# Patient Record
Sex: FEMALE | Employment: FULL TIME | URBAN - METROPOLITAN AREA
[De-identification: names, ages, dates, MRNs, and addresses within clinical notes are randomized per-mention and may not be internally consistent; named-entity substitution may affect disease eponyms.]

---

## 2024-05-23 ENCOUNTER — TELEPHONE (OUTPATIENT)
Dept: NEUROSURGERY | Facility: CLINIC | Age: 54
End: 2024-05-23
Payer: COMMERCIAL

## 2024-05-23 NOTE — TELEPHONE ENCOUNTER
Received [facility] spine referral. Awaiting medical records and imaging.

## 2024-05-29 ENCOUNTER — TELEPHONE (OUTPATIENT)
Dept: NEUROSURGERY | Facility: CLINIC | Age: 54
End: 2024-05-29
Payer: COMMERCIAL

## 2024-05-31 ENCOUNTER — TELEPHONE (OUTPATIENT)
Dept: NEUROSURGERY | Facility: CLINIC | Age: 54
End: 2024-05-31
Payer: COMMERCIAL

## 2024-05-31 NOTE — TELEPHONE ENCOUNTER
Spoke to [facility] patient, telephone spine screen scheduled for 6/4 at 10am. PCP is Flaquita Ahumada, will resend PCP letter.

## 2024-06-03 ENCOUNTER — TELEPHONE (OUTPATIENT)
Dept: NEUROSURGERY | Facility: CLINIC | Age: 54
End: 2024-06-03
Payer: COMMERCIAL

## 2024-06-04 ENCOUNTER — TELEPHONE (OUTPATIENT)
Dept: NEUROSURGERY | Facility: CLINIC | Age: 54
End: 2024-06-04
Payer: COMMERCIAL

## 2024-06-04 RX ORDER — ALBUTEROL SULFATE 0.83 MG/ML
2.5 SOLUTION RESPIRATORY (INHALATION) DAILY PRN
COMMUNITY
Start: 2024-06-03

## 2024-06-04 RX ORDER — BUPROPION HYDROCHLORIDE 300 MG/1
300 TABLET ORAL DAILY
COMMUNITY
Start: 2024-01-24

## 2024-06-04 RX ORDER — ALBUTEROL SULFATE 90 UG/1
AEROSOL, METERED RESPIRATORY (INHALATION) EVERY 4 HOURS PRN
COMMUNITY
Start: 2024-04-18

## 2024-06-04 RX ORDER — CYCLOBENZAPRINE HCL 5 MG
5 TABLET ORAL 3 TIMES DAILY
COMMUNITY
Start: 2024-05-28

## 2024-06-04 RX ORDER — LISDEXAMFETAMINE DIMESYLATE 70 MG/1
70 CAPSULE ORAL EVERY MORNING
COMMUNITY

## 2024-06-04 RX ORDER — CELECOXIB 200 MG/1
200 CAPSULE ORAL DAILY
COMMUNITY
Start: 2024-05-28

## 2024-06-04 NOTE — TELEPHONE ENCOUNTER
"Spoke to patient on: 6/4/24    Patient works at [employer]  Are you currently working? : Yes  - yes, heavy lifting- chiropractor took patient off work for medical leave  currently on intermittently FMLA- started on 6/4 for continuous leave  Are you on workers comp?No   Are you involved in any ligation at this time? No   Do you have HSA insurance? No                Have support to help with care and appointments: Yes   Travel Caregiver:         What is your chief complaint? have back pain - lower back- affects my hips and legs hurting  pain in both hips- pain mostly in left leg and now right leg- pain all the way down- feels heavier in thigh ("whole leg")- from inside knee to thigh gets numb sometimes- "feels tight" like a knot around it    How long has it been going on? few months- had been going to a chiropractor- he would "pull my leg" felt worse    Have you had pain like this before?No     Have you sought treatment for this condition in the past?Yes   If yes, what treatments did you then?  If yes, when did those stop working?    Where is the pain the worst?  lower back    Does the pain radiate? down both legs    Where else do you hurt? pain in upper back but not very often    Is the pain constant or does it go away to a 0/10 at times even if the pain comes right back? intermittent - when working 8-10 at rest about a 6    What makes the pain worse? lifting, squatting, bending- all aggravates    What makes the pain better or decreases the pain (which position is least uncomfortable)? ice    Any other symptoms? numbness in front of thighs Left worse than right  occasional weakness- have to sit down  No falls    Any bowel or bladder incontinence? no  Or changes? no    What treatments have you tried for your current pain, and did they help?   Physical therapy? no   Chiropractor? since February once/twice a month- then was referred to pain management   Injections? What kinds - yes- lumbar (done on May 27th- " "from Dr. Thompson Tijerina- spine and orthopedic)   as of 1 week-   Prior back surgery? no   Medication?      BMI:   Ht: 5'5"  Weight: 209    AIC: 6.1 (January 2024)    Social Hx:    Tobacco Use: Low Risk  (6/4/2024)    Patient History     Smoking Tobacco Use: Never     Smokeless Tobacco Use: Never     Passive Exposure: Not on file                    Allergies:   Review of patient's allergies indicates:  No Known Allergies      Medication list:   Current Outpatient Medications on File Prior to Visit   Medication Sig Dispense Refill    albuterol (PROVENTIL) 2.5 mg /3 mL (0.083 %) nebulizer solution Take 2.5 mg by nebulization daily as needed.      albuterol (PROVENTIL/VENTOLIN HFA) 90 mcg/actuation inhaler Inhale into the lungs every 4 (four) hours as needed for Wheezing.      buPROPion (WELLBUTRIN XL) 300 MG 24 hr tablet Take 300 mg by mouth once daily.      celecoxib (CELEBREX) 200 MG capsule Take 200 mg by mouth once daily.      cyclobenzaprine (FLEXERIL) 5 MG tablet Take 5 mg by mouth 3 (three) times daily.      lisdexamfetamine (VYVANSE) 70 MG capsule Take 70 mg by mouth every morning.       No current facility-administered medications on file prior to visit.         Health Hx:  Past Medical History:   Diagnosis Date    ADHD     Anxiety disorder, unspecified     Depression     Headache     Mild intermittent asthma, uncomplicated          Surgical Hx:   Past Surgical History:   Procedure Laterality Date    TUBAL LIGATION           Can you take one flight of stairs: no    Ever been diagnosed with sleep apnea No    CPAPNo    Images Received:  MRI Yes    Type:  Xray Yes   Type:  EMG  No       Surgery recommended: Yes disk replacement    PCP Information:   No primary care provider on file.      Spoke with pt, reviewed history, discussed timeframe and expectation regarding the [facility]ine program, discussed logging in to the [facility] portal and to expect link to complete online seminar, confirmed PCP. Explained I would " be sending imaging, chart review and spine screen assessment to spine surgeon, then to Dr. Campbell to see if any additional orders are needed other than CXR and non bundled labs and I would reach back out when this information if obtained.  Explained that initial trip is an evaluation visit and if surgery needed and determined a surgery date would be determined with surgeon and patient. From there, I would send preop orders to PCP for them to complete at home.       Discussed surgery requiremernts of BMi less than 40, AIC less than 8 and smoking cessation 6 weeks prior to surgery with a required cotine test 2 weeks prior to surgery. Patient verbalized understanding.      Patient verbalized understanding of the above and instructed to reach out with any questions or concerns. Direct phone # given and explained the use of patient portal communication.      Patient wants me to send her chiropractor/medicine doctor the PCP agreement.  Will fax to Dr. Salazar  [phone]/fax [phone]    Once PCP agreement received, can schedule initial evaluation.    Anh John, RN, CMSRN  RN Navigator  [facility]  Ph [phone]  Fax [phone]

## 2024-06-11 ENCOUNTER — TELEPHONE (OUTPATIENT)
Dept: NEUROSURGERY | Facility: CLINIC | Age: 54
End: 2024-06-11
Payer: COMMERCIAL

## 2024-06-11 DIAGNOSIS — M54.9 DORSALGIA: Primary | ICD-10-CM

## 2024-06-11 NOTE — TELEPHONE ENCOUNTER
Received PCP agreement. Called patient and discussed initial evaluation dates- patient will fly in 6/23-6/26. Will submit Plan of care to [id number] and work on appointments. Patient agreeable to dates.      Anh John RN, CMSRN  RN Navigator  [facility]   [phone]  Fax [phone]

## 2024-06-21 NOTE — PROGRESS NOTES
Subjective:     Patient ID: Yudelka Caceres is a 53 y.o. female.    Chief Complaint: Low-back Pain    Ms Caceres is a 54 yo female [facility] patient here for evaluation of low back pain.  She has had back pain for the past year but worse the past 4 months.  The pain started with spasms in hips.  The pain is the thigh.  The pain is sometimes inner thigh and sometimes outer and sometimes front.  There is some feet burning.  The pain is worse with standing and walking.  She is off work and so pain is better.  It hurt more with walking squatting and lifting at work.  The pain is throbbing and tingling.  The pain can be shocking.  The pain is not with transition.  The pain is 2/10 now, worst 6/10 walking in airport, best 2/10 in the morning before much activity.  She has not been to PT.  She has been going to chiropractor with no relief.  She had injections with some relief but complete relief.  She had injection 1 month ago with 30% relief.  She is taking celebrex and flexeril    X-ray lumbar 6/24/2024  Lumbar spine five views:     No pars defects are seen.  Alignment is normal.  There is mild DJD.  No acute trauma seen.     No instability seen.     Impression:     No acute process seen.      MRI lumbar 4/9/2024  There is normal alignment.  L1-2: There is no disc herniation.  L2-3: There is no disc herniation.  L3-4: There is a slight disc bulge and there is mild to moderate 1 degenerative facet change.  L4-5: There is a mild disc bulge and moderate degenerative facet change.  L541: There is disc space narrowing and degenerative endplate change. There is a mild disc bulge combining with  degenerative facet change to cause bilateral foraminal stenosis. There is normal signal in the thecal sac and nerve roots.  There is an incidentally noted left renal cyst.  IMPRESSION  1. Degenerative disc and facet change at multiple levels as described above.  2. At L5-S1 there is bilateral foraminal stenosis.    Past Medical History:  No  date: ADHD  No date: Anxiety disorder, unspecified  No date: Depression  No date: Headache  No date: Mild intermittent asthma, uncomplicated    Past Surgical History:  No date: TUBAL LIGATION    No family history on file.      Social History    Socioeconomic History      Marital status: Single    Tobacco Use      Smoking status: Never      Smokeless tobacco: Never      Current Outpatient Medications:  albuterol (PROVENTIL) 2.5 mg /3 mL (0.083 %) nebulizer solution, Take 2.5 mg by nebulization daily as needed., Disp: , Rfl:   albuterol (PROVENTIL/VENTOLIN HFA) 90 mcg/actuation inhaler, Inhale into the lungs every 4 (four) hours as needed for Wheezing., Disp: , Rfl:   buPROPion (WELLBUTRIN XL) 300 MG 24 hr tablet, Take 300 mg by mouth once daily., Disp: , Rfl:   celecoxib (CELEBREX) 200 MG capsule, Take 200 mg by mouth once daily., Disp: , Rfl:   cyclobenzaprine (FLEXERIL) 5 MG tablet, Take 5 mg by mouth 3 (three) times daily., Disp: , Rfl:   lisdexamfetamine (VYVANSE) 70 MG capsule, Take 70 mg by mouth every morning., Disp: , Rfl:     No current facility-administered medications for this visit.      Review of patient's allergies indicates:  No Known Allergies        Review of Systems   Constitutional: Negative for weight gain and weight loss.   Cardiovascular:  Negative for chest pain.   Respiratory:  Positive for shortness of breath.    Musculoskeletal:  Positive for back pain. Negative for joint pain and joint swelling.   Gastrointestinal:  Negative for abdominal pain, bowel incontinence, nausea and vomiting.   Genitourinary:  Negative for bladder incontinence.   Neurological:  Positive for numbness and paresthesias (feet and legs and hands).        Objective:     General: Yudelka is well-developed, well-nourished, appears stated age, in no acute distress, alert and oriented to time, place and person.     General    Vitals reviewed.  Constitutional: She is oriented to person, place, and time. She appears  well-developed and well-nourished.   HENT:   Head: Normocephalic and atraumatic.   Pulmonary/Chest: Effort normal.   Neurological: She is alert and oriented to person, place, and time.   Psychiatric: She has a normal mood and affect. Her behavior is normal. Judgment and thought content normal.     General Musculoskeletal Exam   Gait: normal     Right Ankle/Foot Exam     Tests   Heel Walk: able to perform  Tiptoe Walk: able to perform    Left Ankle/Foot Exam     Tests   Heel Walk: able to perform  Tiptoe Walk: able to perform  Back (L-Spine & T-Spine) / Neck (C-Spine) Exam     Tenderness Right paramedian tenderness of the Sacrum. Left paramedian tenderness of the Sacrum.     Back (L-Spine & T-Spine) Range of Motion   Extension:  20   Flexion:  90   Lateral bend right:  20   Lateral bend left:  20   Rotation right:  40   Rotation left:  40     Spinal Sensation   Right Side Sensation  C-Spine Level: normal   L-Spine Level: normal  S-Spine Level: normal  Left Side Sensation  C-Spine Level: normal  L-Spine Level: normal  S-Spine Level: normal    Back (L-Spine & T-Spine) Tests   Right Side Tests  Straight leg raise:        Sitting SLR: > 70 degrees    Left Side Tests  Straight leg raise:       Sitting SLR: > 70 degrees      Other   She has no scoliosis .  Spinal Kyphosis:  Absent    Comments:  Left hip pain with ER of the left hip  Neg LAVERN bilaterally      Muscle Strength   Right Upper Extremity   Biceps: 5/5   Deltoid:  5/5  Triceps:  5/5  Wrist extension: 5/5   Finger Flexors:  5/5  Left Upper Extremity  Biceps: 5/5   Deltoid:  5/5  Triceps:  5/5  Wrist extension: 5/5   Finger Flexors:  5/5  Right Lower Extremity   Hip Flexion: 5/5   Quadriceps:  5/5   Anterior tibial:  5/5   EHL:  5/5  Left Lower Extremity   Hip Flexion: 5/5   Quadriceps:  5/5   Anterior tibial:  5/5   EHL:  5/5    Reflexes     Left Side  Biceps:  2+  Triceps:  2+  Brachioradialis:  2+  Achilles:  2+  Left Morrison's Sign:  Absent  Babinski Sign:   absent  Quadriceps:  2+    Right Side   Biceps:  2+  Triceps:  2+  Brachioradialis:  2+  Achilles:  2+  Right Morrison's Sign:  absent  Babinski Sign:  absent  Quadriceps:  2+    Vascular Exam     Right Pulses        Carotid:                  2+    Left Pulses        Carotid:                  2+          Assessment:     1. Spondylosis of lumbar region without myelopathy or radiculopathy    2. Bilateral hip pain         Plan:     Orders Placed This Encounter    X-Ray Hips Bilateral 2 View Incl AP Pelvis    Ambulatory referral/consult to [facility]     We discussed back pain and the nature of back pain.  We discussed that it is not one thing that causes the pain but an accumulation of multiple things that we do.  She has had pain for a year but worse the past 4 months.  Imaging shows DJD.  She does have some pain with left hip ROM  Hip x-rays  We discussed posture sitting and the importance of trying to sit better.  We discussed the importance of sitting with good posture and standing breaks.  She is off work, so encourage her to stay active  We discussed the benefits of therapy and exercise and continuing to move.  She says her  reminds her to stay active  Healthy back eval today we discussed piriformis stretches and back and core strengthening flexion exercises  We discussed pt close to home  Treat your own back book  Neurosurgery and pain to evaluate    More than 50% of the total time  of 45 minutes was spent face to face in counseling on diagnosis and treatment options. I also counseled patient  on common and most usual side effect of prescribed medications.  I reviewed Primary care , and other specialty's notes to better coordinate patient's care. All questions were answered, and patient voiced understanding.       Follow-up: No follow-ups on file. If there are any questions prior to this, the patient was instructed to contact the office.

## 2024-06-24 ENCOUNTER — HOSPITAL ENCOUNTER (OUTPATIENT)
Dept: RADIOLOGY | Facility: OTHER | Age: 54
Discharge: HOME | End: 2024-06-24
Attending: NEUROLOGICAL SURGERY
Payer: COMMERCIAL

## 2024-06-24 ENCOUNTER — CLINICAL SUPPORT (OUTPATIENT)
Dept: REHABILITATION | Facility: OTHER | Age: 54
End: 2024-06-24
Payer: COMMERCIAL

## 2024-06-24 ENCOUNTER — OFFICE VISIT (OUTPATIENT)
Dept: SPINE | Facility: CLINIC | Age: 54
End: 2024-06-24
Payer: COMMERCIAL

## 2024-06-24 ENCOUNTER — OFFICE VISIT (OUTPATIENT)
Dept: SPINE | Facility: CLINIC | Age: 54
End: 2024-06-24
Attending: PHYSICAL MEDICINE & REHABILITATION
Payer: COMMERCIAL

## 2024-06-24 ENCOUNTER — HOSPITAL ENCOUNTER (OUTPATIENT)
Dept: RADIOLOGY | Facility: OTHER | Age: 54
Discharge: HOME | End: 2024-06-24
Attending: PHYSICAL MEDICINE & REHABILITATION
Payer: COMMERCIAL

## 2024-06-24 ENCOUNTER — PATIENT MESSAGE (OUTPATIENT)
Dept: SPINE | Facility: CLINIC | Age: 54
End: 2024-06-24

## 2024-06-24 VITALS
SYSTOLIC BLOOD PRESSURE: 114 MMHG | HEIGHT: 65 IN | DIASTOLIC BLOOD PRESSURE: 56 MMHG | WEIGHT: 205.94 LBS | RESPIRATION RATE: 19 BRPM | HEART RATE: 80 BPM | TEMPERATURE: 98 F | BODY MASS INDEX: 34.31 KG/M2

## 2024-06-24 VITALS — HEART RATE: 80 BPM | DIASTOLIC BLOOD PRESSURE: 56 MMHG | TEMPERATURE: 98 F | SYSTOLIC BLOOD PRESSURE: 114 MMHG

## 2024-06-24 VITALS — SYSTOLIC BLOOD PRESSURE: 114 MMHG | HEART RATE: 80 BPM | DIASTOLIC BLOOD PRESSURE: 56 MMHG | TEMPERATURE: 98 F

## 2024-06-24 DIAGNOSIS — M25.551 BILATERAL HIP PAIN: ICD-10-CM

## 2024-06-24 DIAGNOSIS — M47.816 SPONDYLOSIS OF LUMBAR REGION WITHOUT MYELOPATHY OR RADICULOPATHY: Primary | ICD-10-CM

## 2024-06-24 DIAGNOSIS — M54.50 DORSALGIA OF LUMBAR REGION: Primary | ICD-10-CM

## 2024-06-24 DIAGNOSIS — M47.816 SPONDYLOSIS OF LUMBAR REGION WITHOUT MYELOPATHY OR RADICULOPATHY: ICD-10-CM

## 2024-06-24 DIAGNOSIS — M25.552 BILATERAL HIP PAIN: ICD-10-CM

## 2024-06-24 DIAGNOSIS — M51.36 DDD (DEGENERATIVE DISC DISEASE), LUMBAR: ICD-10-CM

## 2024-06-24 DIAGNOSIS — M54.51 VERTEBROGENIC LOW BACK PAIN: Primary | ICD-10-CM

## 2024-06-24 DIAGNOSIS — R29.898 DECREASED STRENGTH OF TRUNK AND BACK: Primary | ICD-10-CM

## 2024-06-24 DIAGNOSIS — M54.9 DORSALGIA: ICD-10-CM

## 2024-06-24 PROCEDURE — 99204 OFFICE O/P NEW MOD 45 MIN: CPT | Mod: S$GLB,COE,, | Performed by: PHYSICAL MEDICINE & REHABILITATION

## 2024-06-24 PROCEDURE — 3074F SYST BP LT 130 MM HG: CPT | Mod: CPTII,S$GLB,COE, | Performed by: ANESTHESIOLOGY

## 2024-06-24 PROCEDURE — 99999 PR PBB SHADOW E&M-EST. PATIENT-LVL III: CPT | Mod: PBBFAC,COE,, | Performed by: NEUROLOGICAL SURGERY

## 2024-06-24 PROCEDURE — 99999 PR PBB SHADOW E&M-EST. PATIENT-LVL III: CPT | Mod: PBBFAC,COE,, | Performed by: ANESTHESIOLOGY

## 2024-06-24 PROCEDURE — 97110 THERAPEUTIC EXERCISES: CPT | Performed by: PHYSICAL MEDICINE & REHABILITATION

## 2024-06-24 PROCEDURE — 99204 OFFICE O/P NEW MOD 45 MIN: CPT | Mod: S$GLB,COE,, | Performed by: ANESTHESIOLOGY

## 2024-06-24 PROCEDURE — 73521 X-RAY EXAM HIPS BI 2 VIEWS: CPT | Mod: TC,FY

## 2024-06-24 PROCEDURE — 3044F HG A1C LEVEL LT 7.0%: CPT | Mod: CPTII,S$GLB,COE, | Performed by: ANESTHESIOLOGY

## 2024-06-24 PROCEDURE — 97162 PT EVAL MOD COMPLEX 30 MIN: CPT | Performed by: PHYSICAL MEDICINE & REHABILITATION

## 2024-06-24 PROCEDURE — 99203 OFFICE O/P NEW LOW 30 MIN: CPT | Mod: S$GLB,COE,, | Performed by: NEUROLOGICAL SURGERY

## 2024-06-24 PROCEDURE — 72114 X-RAY EXAM L-S SPINE BENDING: CPT | Mod: TC,FY

## 2024-06-24 PROCEDURE — 72082 X-RAY EXAM ENTIRE SPI 2/3 VW: CPT | Mod: TC,FY

## 2024-06-24 PROCEDURE — 3078F DIAST BP <80 MM HG: CPT | Mod: CPTII,S$GLB,COE, | Performed by: ANESTHESIOLOGY

## 2024-06-24 PROCEDURE — 1159F MED LIST DOCD IN RCRD: CPT | Mod: CPTII,S$GLB,COE, | Performed by: ANESTHESIOLOGY

## 2024-06-24 PROCEDURE — 99999 PR PBB SHADOW E&M-EST. PATIENT-LVL IV: CPT | Mod: PBBFAC,COE,, | Performed by: PHYSICAL MEDICINE & REHABILITATION

## 2024-06-24 NOTE — PATIENT INSTRUCTIONS
..                          WALKING PROGRAM      An apple a day keeps the Doctor away, could be replaced with, A walk a day keeps the Doctor away!  What is not to love about one of the simplest things you can do for your health?   Walking, as a form of exercise, has been shown to have numerous positive benefits.   Walking is also free, can be done anytime and anywhere, needs no special skill or equipment, and can be done at whatever level of fitness you are currently at.      Research has shown that the benefits of walking for at least 30 minutes a day may be effective to:  Improve blood pressure and blood sugar levels  Improve blood lipid profile  Improve memory and concentration  Improve sleep habits  Enhance mental wellbeing, improving mood and reducing depression  Reduce the risk of coronary heart disease ( the number 1 killer in the [place])  Reduce the risk of osteoporosis  Reduce the risk of breast and colon cancer  Reduce the risk of non-insulin dependent (type 2) diabetes  Reduce body weight and lower the risk of obesity      You need a few essentials before you hit the road.    Walking shoes.   Ensure you have lightweight, breathable, and supportive footwear.      Clothing.  Dress for comfort and the weather.  Wear layers when it is colder.  Remember sunscreen.  Water.  Take frequent sips of water when while you walk.  Ensure you drink before and after your walk.            GETTING STARTED:   Just start walking for 10 minutes, 4 times a week.  If this is very easy for you, start walking 20 minutes, 4 times a week.  Thats it!?  Yes, thats it!  Just walk out the door.  Watch your posture.  Walk tall.   Pretend you are being pulled up by a rope attached to the crown of your head.  Your shoulders should be down, back and relaxed.  Tighten your abdominal muscles and buttock, and fall into a natural stride.  Feel the natural swing of your arms in opposition to your leg swing.  Let the heel of each foot gently  touch the ground and feel the toes of each foot leave the ground last.  Be sure to drink plenty of water before, during, and after walking.   Incorporate a warm up and cool down into your routine.  Start your walk at a slow warm up pace, then walk desired length of time.  End your walk with slower cool down.  Any stretches that your therapist has recommended for you may be done before and after your walk to prevent injury.  The hardest part of starting a fitness program can be developing the habit.  Walking regularly will help (a minimum of 3 days a week is a good goal).  Developing a routine, walking partners, and keeping a journal are ways to help keep your motivation up.  Wearing a pedometer or using an maria elena that records your steps, are motivational as well, and shown by research to increase your activity level.    PROGRESSING:  Once you are walking 4 times a week on a regular basis, you can progress your program by adding 5 minutes to each of your walks that week.   Set a time goal to achieve.   Every week add 5 minutes to your walk.   If your goal is 40 minutes, add 5 minutes weekly until you are comfortably walking 40 minutes 4 times a week.  Once you have achieved your time goal, you can further progress your program by increasing the speed at which you walk.  Dont forget to warm up and cool down as you start walking at a brisker pace.

## 2024-06-24 NOTE — PROGRESS NOTES
Dear Eduar Dent MD,    Thank you for referring this patient to my clinic. The full details of my evaluation will also be forthcoming to you by letter.    CHIEF COMPLAINT:  Low back and hip pain    HPI:  Yudelka Caceres is a 53 y.o.  female with a year of low back and L>R hip pain with standing and walking. It is associated with some shooting pain in the left thigh. SHe denies b/b dysfunction  She has tried injections with some relief but unclear if it was a discogram.    Review of patient's allergies indicates:  No Known Allergies    Past Medical History:   Diagnosis Date    ADHD     Anxiety disorder, unspecified     Depression     Headache     Mild intermittent asthma, uncomplicated      Past Surgical History:   Procedure Laterality Date    TUBAL LIGATION       No family history on file.  Social History     Tobacco Use    Smoking status: Never    Smokeless tobacco: Never        Review of Systems    OBJECTIVE:     Vital Signs:  Temp: 98.4 °F (36.9 °C) (06/24/24 1039)  Pulse: 80 (06/24/24 1039)  BP: (!) 114/56 (06/24/24 1039)    Physical Exam:    Vital signs: All nursing notes and vital signs reviewed -- afebrile, vital signs stable.  Constitutional: Patient sitting comfortably in chair. Appears well developed and well nourished.  Skin: Exposed areas are intact without abnormal markings, rashes or other lesions.  HEENT: Normocephalic. Normal conjunctivae.  Cardiovascular: Normal rate and regular rhythm.  Respiratory: Chest wall rises and falls symmetrically, without signs of respiratory distress.  Abdomen: Soft and non-tender.  Extremities: Warm and without edema. Calves supple, non-tender.  Psych/Behavior: Normal affect.    Neurological:    Mental status: Alert and oriented. Conversational and appropriate.       Cranial Nerves: VFF to confrontation. PERRL. EOMI without nystagmus. Facial STLT normal and symmetric. Strong, symmetric muscles of mastication. Facial strength full and symmetric. Hearing equal  bilaterally to finger rub. Palate and uvula rise and fall normally in midline. Shoulder shrug 5/5 strength. Tongue midline.     Motor:    Upper:  Deltoids Triceps Biceps WE WF     R 5/5 5/5 5/5 5/5 5/5 5/5    L 5/5 5/5 5/5 5/5 5/5 5/5      Lower:  HF KE KF DF PF EHL    R 5/5 5/5 5/5 5/5 5/5 5/5    L 5/5 5/5 5/5 5/5 5/5 5/5     Sensory: Intact sensation to light touch in all extremities. Romberg negative.    Reflexes:          DTR: 2+ symmetrically throughout.     Abdominal reflexes: Normal, symmetric.     Morrison's: Negative.     Babinski's: Negative.     Clonus: Negative.    Cerebellar: Finger-to-nose and rapid alternating movements normal. Gait stable, fluid.    Spine:    Lumbar:     Midline TTP: Positive     Other:     SI joint TTP: Positive     Greater trochanter TTP: Positive     Tenderness with external/internal hip rotation: POstive left    Diagnostic Results:  All imaging was independently reviewed by me.    MRI L spine:  1. DDD at L5-S1  2. No signficant stenosis    Flex/Ex X-ray L spine:  1. No instability    Scoliosis standing AP and Lateral X-ray:  1. No deformity    ASSESSMENT/PLAN:     Yudelka Caceres has axial low back pain and bilateral hip pain with some referred pain to the left thigh. I recommend bilateral hip evaluation and possible injections. I'm not seeing much surgical spinal pathology. There is DDD at L5-S1 but she does not have instability, deformity or stenosis. I recommend continued conservative therapy with respect to the spine at this time..    The patient understands and agrees with the plan of care. All questions were answered.               Eduar Hemphill M.D.  Department of Neurosurgery  [facility]      .

## 2024-06-24 NOTE — PLAN OF CARE
"   [facility]- PHYSICAL THERAPY EVALUATION - [facility] [place]     Name: Yudelka Caceres  Clinic Number: [id number]    Therapy Diagnosis:   Encounter Diagnosis   Name Primary?    Decreased strength of trunk and back Yes     Physician: No ref. provider found    Physician Orders: PT Eval and Treat    Medical Diagnosis from Referral: M47.816 (ICD-10-CM) - Spondylosis of lumbar region without myelopathy or radiculopathy  Evaluation Date: 6/24/2024  Authorization Period Expiration: 6/24/2025  Plan of Care Expiration: 1 visit for VIC  Visit # / Visits authorized: 1/ 1    Time In: 1:31 PM  Time Out: 2:30 pm  Total Billable Time: 60 minutes    Precautions: Standard    Pattern of pain determined: 1 PEN    Subjective   Date of onset: ~ 2023 (most recent hip pain onset within 6 months.  History of current condition - Yudelka reports: Ms Caceres is a 52 yo female [facility] [place] patient here for evaluation of low back pain.  She has had back pain for the past year but worse the past 4 months.  The pain started with spasms in hips.  The pain is the thigh.  The pain is sometimes inner thigh and sometimes outer and sometimes front.  There is some feet burning.  The pain is worse with standing and walking.  She is off work and so pain is better.  It hurt more with walking squatting and lifting at work.  The pain is throbbing and tingling.  The pain can be shocking.  The pain is not with transition.  The pain is 2/10 now, worst 6[place]10 walking in airport, best 2/10 in the morning before much activity.  She has not been to PT.  She has been going to chiropractor with no relief.  She had injections with some relief but complete relief.  She had injection 1 month ago with 30% relief.  She is taking celebrex and flexeril    **Patient reports that approximately 6 months ago she started to have spasms ("shocks") in bilateral hips.The onset of her low back pain approximately one year ago subsided prior to hip pain onset. Bilateral hip pain is  in both legs and down bilateral " thighs, along with numbness and tingling in upper/lower legs and bilateral feet. She is pre-diabetic. Low back pain is constant, dull and achy.      X-ray lumbar 6/24/2024   Impression:  No acute process seen.     MRI lumbar 4/9/2024  IMPRESSION  1. Degenerative disc and facet change at multiple levels as described above.  2. At L5-S1 there is bilateral foraminal stenosis.        Medical History:   Past Medical History:   Diagnosis Date    ADHD     Anxiety disorder, unspecified     Depression     Headache     Mild intermittent asthma, uncomplicated      Surgical History:   Yudelka Caceres  has a past surgical history that includes Tubal ligation.    Medications:   Yudelka has a current medication list which includes the following prescription(s): albuterol, albuterol, bupropion, celecoxib, cyclobenzaprine, and lisdexamfetamine.    Allergies:   Review of patient's allergies indicates:  No Known Allergies     Imaging, X-ray studies: 6/11/2024 FINDINGS:  Lumbar spine five views:  No pars defects are seen.  Alignment is normal.  There is mild DJD.  No acute trauma seen   No instability seen.  Impression:  No acute process seen.     FINDINGS:  Scoliosis complete.  There is mild DJD.  There is dish at midthoracic levels.  No acute fracture dislocation bone destruction seen.  No congenital anomaly seen.  There is no significant scoliosis.    X-ray lumbar 6/24/2024   Impression:  No acute process seen.     MRI lumbar 4/9/2024  IMPRESSION  1. Degenerative disc and facet change at multiple levels as described above.  2. At L5-S1 there is bilateral foraminal stenosis.    Prior Therapy: No  Prior Treatment: Lumbar injections (mild relief)  Social History: 1-story home, 3 ANG[address], lives with their spouse  Occupation: [employer] employee  Leisure: Play with grandchildren  Prior Level of Function: Independent  Current Level of Function: Independent  DME owned/used: none    Pain:  Current 2/10, worst 10/10, best 2/10   Location: bilateral  "back , buttocks , lower legs, and upper legs  Description: Aching, Dull, Tingling, Numb, and spasming  Aggravating Factors: Bending, Walking, Lifting, and carrying objects  Easing Factors: Forward bending  Disturbed Sleep: Yes    Pattern of pain questions:  1.  Where is your pain the worst? Bilateral hips  2.  Is your pain constant or intermittent? Constant  3.  Does bending forward make your typical pain worse? No  4.  Since the start of your back pain, has there been a change in your bowel or bladder? No  5.  What can't you do now that you use to be able to do? Work at [employer], lift [employer], walk    Pts goals: "Walk without back/hips hurting."      Red Flag Screening:   Cough  Sneeze  Strain: (+)  Bladder/ bowel: (--)  Falls: (--)  Night pain: (--)  Unexplained weight loss: (--)  General health: fair    OBJECTIVE     Postural examination/scapula alignment: Rounded shoulder, Head forward, Affected scapula abducted, Slouched posture, and Increased kyphosis  Sitting: Anterior pelvic tilt,   Standing: Resting bilateral knee valgus, depressed left shoulder  Correction of posture: better with lumbar roll    MOVEMENT LOSS    ROM Loss   Flexion minimal loss   Extension moderate loss   Side bending Right moderate loss   Side bending Left moderate loss   Rotation Right moderate loss   Rotation Left moderate loss     Lower Extremity Strength  Right LE  Left LE    Hip flexion: 5/5 Hip flexion: 5/5   Hip extension:  5/5 Hip extension: 5/5   Hip abduction: 5/5 Hip abduction: 5/5   Hip adduction:  5/5 Hip adduction:  5/5   Hip Internal rotation   5/5 Hip Internal rotation 5/5   Knee Flexion 5/5 Knee Flexion 5/5   Knee Extension 5/5 Knee Extension 5/5   Ankle dorsiflexion: 5/5 Ankle dorsiflexion: 5/5   Ankle plantarflexion: 5/5 Ankle plantarflexion: 5/5       GAIT:  Assistive Device used: none  Level of Assistance: independent  Patient displays the following gait deviations:  no gait deviations observed.     Special Tests: "   Test Name  Test Result   Prone Instability Test (--)   SI Joint Provocation Test (--)   Straight Leg Raise (--)   Neural Tension Test (--)   Crossed Straight Leg Raise (--)   Walking on toes (--)   Walking on heels  (--)   Additional Tests        NEUROLOGICAL SCREENING     Sensory deficit: All lower quarter dermatomes WFL    Reflexes:    Left Right   Patella Tendon 1+ 1+   Achilles Tendon 2+ 2+   Babinski  (--) (--)   Clonus (--) (--)       REPEATED TEST MOVEMENTS:  Repeated Flexion in Standing no effect   Repeated Extension in Standing no worse   Repeated Flexion in lying abolished   Repeated Extension in lying  end range pain  pain during motion  no worse       Treatment   Treatment Time In: 2:15 pm  Treatment Time Out: 2:30 pm  Total Treatment time separate from Evaluation: 15 minutes    Yudelka received therapeutic exercises to develop/improve posture, lumbarl ROM, strength and muscular endurance for 15 minutes including the following exercises:     DKTC w/swiss ball   LTR  PPT  Bridges  Use lumbar roll  Walk daily    Written Home Exercises Provided: yes.  Exercises were reviewed and Yudelka was able to demonstrate them prior to the end of the session.  Yudelka demonstrated good  understanding of the education provided.     See EMR under Patient Instructions for exercises provided 6/24/2024.      Education provided:   - Patient received education regarding proper posture and body mechanics.  Patient was given [facility] handout which discusses  back education, care specifically for posture seated, standing, lifting correctly, components of exercise, tips for back pain management, positioning and  importance of sleep.   Information on lumbar rolls provided.        Assessment   Yudelka is a 53 y.o. female referred to [facility] with a medical diagnosis of M47.816 (ICD-10-CM) - Spondylosis of lumbar region without myelopathy or radiculopathy. Pt presents with M47.816 (ICD-10-CM) - Spondylosis of lumbar region  without myelopathy or radiculopathy.  Pt presents with reported constant back pain   that is reproduced standing/walking/movement and reduced with sitting/bending indicating directional preference of unloaded flexion.  She responded to Z lie, flexion based activities and core activation.   Upon physical assessment, pt demonstrates slouched posturing in sitting, core  weakness and trunk strength deficits.  Pt would benefit from LE and trunk mobility training, stability training,  improved cardiovascular and muscular endurance, neuromuscular re-education for posture, coordination, and muscular recruitment and education on positional offloading techniques to decrease the intensity and frequency of flare-ups.   Recommend PT when she returns home    Pain Pattern: 1 PEN       Pt prognosis is Fair.     Patient was seen in therapy as part of [facility] [place]  [facility].    Patient was given back care educational information verbally and in writing.  A lumbar roll and Christine Treat your own Back book were provided.   The patient was given a home exercise program to continue with independently and was able to perform exercises in the clinic by the end of the treatment session today.      Plan of care discussed with patient: Yes  Pt's spiritual, cultural and educational needs considered and patient is agreeable to the plan of care and goals as stated below:     Anticipated Barriers for therapy: seeing patient 1 visit    PT Evaluation Completed? Yes    Medical necessity is demonstrated by the following problem list.    Pt presents with the following impairments:     History  Co-morbidities and personal factors that may impact the plan of care Co-morbidities:    ADHD    Anxiety disorder, unspecified    Depression    Headache    Mild intermittent asthma, uncomplicated     Personal Factors:   coping style  attitudes     moderate   Examination  Body Structures and Functions, activity limitations and participation restrictions  "that may impact the plan of care Body Regions:   back  lower extremities    Body Systems:    gross symmetry  ROM  strength  gross coordinated movement  motor control    Participation Restrictions:   Patient seen 1 visit as part of VIC program    Activity limitations:   Learning and applying knowledge  no deficits    General Tasks and Commands  no deficits    Communication  no deficits    Mobility  lifting and carrying objects  using transportation (bus, train, plane, car)  driving (bike, car, motorcycle)    Self care  washing oneself (bathing, drying, washing hands)  caring for body parts (brushing teeth, shaving, grooming)  looking after one's health    Domestic Life  shopping  cooking  doing house work (cleaning house, washing dishes, laundry)    Interactions/Relationships  no deficits    Life Areas  no deficits    Community and Social Life  no deficits         moderate   Clinical Presentation evolving clinical presentation with changing clinical characteristics moderate   Decision Making/ Complexity Score: moderate       GOALS: Pt is in agreement with the following goals.    Short term goals:    Provide educational information to patient regarding back care education for posture, lifting, sleeping, activities of daily living   MET 6/24/24 with education given  Provide a home exercise program that the patient is able to perform independently to continue to work on functional goals  MET 6/24/24 with education given  Provide a walking program for continued health and wellness   MET 6/24/24 with education given    Plan   Recommend PT when she returns home    Therapist: Brea Brock, PT  6/24/2024    "I certify the need for these services furnished under this plan of treatment and while under my care."    ____________________________________  Physician/Referring Practitioner    _______________  Date of Signature           "

## 2024-06-24 NOTE — PROGRESS NOTES
PCP: Margie, Primary Doctor    REFERRING PHYSICIAN: Eduar Hemphill MD    CHIEF COMPLAINT: low back and hip pain    Original HISTORY OF PRESENT ILLNESS: Yudelka Caceres presents to the clinic for the evaluation of the above pain. The pain started years ago but worsened for last 6 months    Original Pain Description:  The pain is located in the low back and does not go past the knee. The pain is described as sharp intermittently but constantly dull. Exacerbating factors: Bending and Walking, worsens after 4 hours of working. Mitigating factors rest. Symptoms interfere with daily activity. The patient feels like symptoms have been worsening. Patient denies urinary incontinence, bowel incontinence, and significant motor weakness. Back hurts more than the hips. Worsened with coughing and bending. Works at [employer] and lives near [place].     Original PAIN SCORES:  Best: Pain is 2  Worst: Pain is 10  Current: Pain is 2         No data to display                INTERVAL HISTORY: (Newest visit at the bottom)   Interval History (Date):       6 weeks of Conservative therapy:  PT: Has been doing physical therapy excercises at home. (Must include dates)  Chiro: Yes  HEP: Is doing HEP at home      Treatments / Medications: (Ice/Heat/NSAIDS/APAP/etc):  Tylenol  Ice  Celebrex    Interventional Pain Procedures: (Previous injections)  Had injection in [place], 1-2 months ago. Likely epidural, mild but only temporary relief      Past Medical History:   Diagnosis Date    ADHD     Anxiety disorder, unspecified     Depression     Headache     Mild intermittent asthma, uncomplicated      Past Surgical History:   Procedure Laterality Date    TUBAL LIGATION       Social History     Socioeconomic History    Marital status: Single   Tobacco Use    Smoking status: Never    Smokeless tobacco: Never     No family history on file.    Review of patient's allergies indicates:  No Known Allergies    Current Outpatient Medications   Medication Sig     albuterol (PROVENTIL) 2.5 mg /3 mL (0.083 %) nebulizer solution Take 2.5 mg by nebulization daily as needed.    albuterol (PROVENTIL/VENTOLIN HFA) 90 mcg/actuation inhaler Inhale into the lungs every 4 (four) hours as needed for Wheezing.    buPROPion (WELLBUTRIN XL) 300 MG 24 hr tablet Take 300 mg by mouth once daily.    celecoxib (CELEBREX) 200 MG capsule Take 200 mg by mouth once daily. (Patient not taking: Reported on 6/24/2024)    cyclobenzaprine (FLEXERIL) 5 MG tablet Take 5 mg by mouth 3 (three) times daily.    lisdexamfetamine (VYVANSE) 70 MG capsule Take 70 mg by mouth every morning.     No current facility-administered medications for this visit.       ROS:  GENERAL: No fever. No chills. No fatigue. Denies weight loss. Denies weight gain.  HEENT: Denies headaches. Denies vision change. Denies eye pain. Denies double vision. Denies ear pain.   CV: Denies chest pain.   PULM: Denies of shortness of breath.  GI: Denies constipation. No diarrhea. No abdominal pain. Denies nausea. Denies vomiting. No blood in stool.  HEME: Denies bleeding problems.  : Denies urgency. No painful urination. No blood in urine.  MS: + back pain  SKIN: Denies rash.   NEURO: Denies seizures. No weakness.  PSYCH:  Denies difficulty sleeping. No anxiety. Denies depression. No suicidal thoughts.       VITALS:   Vitals:    06/24/24 1150   BP: (!) 114/56   Pulse: 80   Temp: 98.4 °F (36.9 °C)   PainSc:   2   PainLoc: Back         PHYSICAL EXAM:   GENERAL: Well appearing, in no acute distress, alert and oriented x3.  PSYCH:  Mood and affect appropriate.  SKIN: Skin color, texture, turgor normal, no rashes or lesions.  HEENT:  Normocephalic, atraumatic. Cranial nerves grossly intact.  NECK: No pain to palpation over the cervical paraspinous muscles. No pain to palpation over facets. No pain with neck flexion, extension, or lateral flexion.   PULM: No evidence of respiratory difficulty, symmetric chest rise.  GI:  Non-distended  BACK: No  pain to palpation over the spinous processes. + TTP over right lumbar facets There is no pain with palpation over the sacroiliac joints bilaterally. Facet loading positive on right.   Patient unable to hold legs for extended time for Milgrams, produced discomfort. SI provoking maneuvers are negative.   EXTREMITIES: No deformities, edema, or skin discoloration.   MUSCULOSKELETAL: Hip and knee provocative maneuvers are negative. No atrophy is noted.  NEURO: Sensation is equal and appropriate bilaterally. Bilateral upper and lower extremity strength is normal and symmetric. Bilateral upper and lower extremity coordination and muscle stretch reflexes are physiologic and symmetric. Plantar response are downgoing. Straight leg raising in the supine position is negative to radicular pain.   GAIT: normal.      IMAGING:        ASSESSMENT: 53 y.o. year old female with pain, consistent with:    Encounter Diagnoses   Name Primary?    Vertebrogenic low back pain Yes    DDD (degenerative disc disease), lumbar        DISCUSSION: Yudelka Caceres is a young coman who comes to us as part of the VIC program. She has low back pain which is worst with coughing and bending. Imaging shows DDD and modic changes at L5-S1. Exam shows pain reproduced with facet loading and palpation of lumbar facets on right. She is not currently considered a surgical candidate.     PLAN:  Reviewed MRI with Ms. Caceres  Discussed treatment options  Recommend she consider Basivertebral nerve Ablation  Referred her to Dedrick Katz who practices in [place]  Follow up PRN      I would like to thank Eduar Hemphill MD for the opportunity to assist in the care of this patient. We had a very nice visit and I look forward to continuing their care. Please let me know if I can be of further assistance.     Fidel Wakefield  06/24/2024   [employer] Pain Fellow

## 2024-06-27 ENCOUNTER — TELEPHONE (OUTPATIENT)
Dept: NEUROSURGERY | Facility: CLINIC | Age: 54
End: 2024-06-27
Payer: COMMERCIAL

## 2024-06-27 NOTE — TELEPHONE ENCOUNTER
VIC patient, not surgical, sent progress notes and recs to [facility] with virtual PT referral and all clinical notes sent to PC Dr. Jose Mckenzie ph [phone]/fax [phone]. All appointments linked and bundle closed.    Anh John, RN, CMSRN  RN Navigator  [facility]  Ph [phone]  Fax [phone]

## 2024-07-10 ENCOUNTER — TELEPHONE (OUTPATIENT)
Dept: ORTHOPEDICS | Facility: CLINIC | Age: 54
End: 2024-07-10
Payer: COMMERCIAL

## 2024-07-10 NOTE — TELEPHONE ENCOUNTER
Patient reports was not contacted by virtual PT to do first appt.   Is asking to either have contact from [facility] or go in person.  Advised if she wants to go in person, she needs to find a location that is in network with her insurance and I will send her orders over.  Pt v/u.  States she will find a location and call me back.